# Patient Record
Sex: MALE | Race: WHITE | Employment: FULL TIME | URBAN - METROPOLITAN AREA
[De-identification: names, ages, dates, MRNs, and addresses within clinical notes are randomized per-mention and may not be internally consistent; named-entity substitution may affect disease eponyms.]

---

## 2019-04-07 ENCOUNTER — APPOINTMENT (OUTPATIENT)
Dept: GENERAL RADIOLOGY | Facility: CLINIC | Age: 34
End: 2019-04-07
Attending: EMERGENCY MEDICINE
Payer: COMMERCIAL

## 2019-04-07 ENCOUNTER — HOSPITAL ENCOUNTER (EMERGENCY)
Facility: CLINIC | Age: 34
Discharge: HOME | End: 2019-04-07
Attending: EMERGENCY MEDICINE | Admitting: EMERGENCY MEDICINE
Payer: COMMERCIAL

## 2019-04-07 VITALS
TEMPERATURE: 98.3 F | OXYGEN SATURATION: 99 % | HEART RATE: 71 BPM | WEIGHT: 165 LBS | DIASTOLIC BLOOD PRESSURE: 57 MMHG | BODY MASS INDEX: 23.62 KG/M2 | HEIGHT: 70 IN | RESPIRATION RATE: 16 BRPM | SYSTOLIC BLOOD PRESSURE: 152 MMHG

## 2019-04-07 DIAGNOSIS — S90.31XA CONTUSION OF RIGHT FOOT, INITIAL ENCOUNTER: ICD-10-CM

## 2019-04-07 PROCEDURE — 25000132 ZZH RX MED GY IP 250 OP 250 PS 637: Performed by: EMERGENCY MEDICINE

## 2019-04-07 PROCEDURE — 73630 X-RAY EXAM OF FOOT: CPT | Mod: RT

## 2019-04-07 PROCEDURE — 99283 EMERGENCY DEPT VISIT LOW MDM: CPT

## 2019-04-07 RX ORDER — IBUPROFEN 400 MG/1
800 TABLET, FILM COATED ORAL ONCE
Status: COMPLETED | OUTPATIENT
Start: 2019-04-07 | End: 2019-04-07

## 2019-04-07 RX ADMIN — IBUPROFEN 800 MG: 400 TABLET ORAL at 20:35

## 2019-04-07 ASSESSMENT — ENCOUNTER SYMPTOMS: ARTHRALGIAS: 1

## 2019-04-07 ASSESSMENT — MIFFLIN-ST. JEOR: SCORE: 1699.69

## 2019-04-07 NOTE — LETTER
April 7, 2019      To Whom It May Concern:      Aren Collier was seen in our Emergency Department today, 04/07/19. He has an injury that may require him to not bear weight on his right foot. Please allow him leniency at work for the next three days as he may not be able to do many work duties.    Sincerely,        Sarahi Burroughs MD

## 2019-04-07 NOTE — ED AVS SNAPSHOT
Emergency Department  [address] [place] [id number]  Phone:  [phone]  Fax:  [phone]                                    Aren Collier   MRN: [id number]    Department:   Emergency Department   Date of Visit:  4/7/2019           After Visit Summary Signature Page    I have received my discharge instructions, and my questions have been answered. I have discussed any challenges I see with this plan with the nurse or doctor.    ..........................................................................................................................................  Patient/Patient Representative Signature      ..........................................................................................................................................  Patient Representative Print Name and Relationship to Patient    ..................................................               ................................................  Date                                   Time    ..........................................................................................................................................  Reviewed by Signature/Title    ...................................................              ..............................................  Date                                               Time          22EPIC Rev 08/18

## 2019-04-08 NOTE — ED PROVIDER NOTES
"  History     Chief Complaint:  Foot Pain      HPI   Aren Collier is a 33 year old male who presents to the emergency department today for evaluation of foot pain. The patient reports today while opening up his car door hit the top of his R foot on the corner of the door. He developed short lasting pain on the dorsum of his right foot. He was able to ambulate and bear weight after this though continued to have mild pain. Then at 1600 today, he was stepping out of his car, and suddenly wasn't able to bear weight due to the pain. He tried icing his foot and soaking in epsom salts without improvement of his symptoms. Due to the increasing pain the patient presented to the emergency department today. The patient has no other concerns at this time.      Allergies:  No Known Drug Allergies        Medications:    The patient is currently on no regular medications.        Past Medical History:    History reviewed. No pertinent past medical history.       Past Surgical History:    Jaw surgery      Family History:    History reviewed. No pertinent family history.        Social History:  The patient was accompanied to the ED by himself.  Smoking Status: Never Smoker  Smokeless Tobacco: Never Used  Alcohol Use: No   Marital Status:  Single [1]       Review of Systems   Musculoskeletal: Positive for arthralgias (right dorsum foot).   All other systems reviewed and are negative.        Physical Exam   First Vitals:  BP: 152/57  Pulse: 71  Temp: 98.3  F (36.8  C)  Resp: 16  Height: 177.8 cm (5' 10\")  Weight: 74.8 kg (165 lb)  SpO2: 99 %      Physical Exam  General/Appearance: appears stated age, well-groomed, appears comfortable  Eyes: EOMI, no scleral injection, no icterus  ENT: MMM  Neck: supple, nl ROM, no stiffness  Cardiovascular: RRR, nl S1S2, no m/r/g, 2+ pulses in all 4 extremities, cap refill <2sec  Respiratory: CTAB, good air movement throughout, no wheezes/rhonchi/rales, no increased WOB, no retractions  MSK: ABBIE, " good tone, no bony abnormality, able to move toes without pain/difficulty, no ttp to base of 5th metatarsal/ankle, mild edema and ttp to mid-R dorsal foot  Skin: warm and well-perfused, no rash, no edema, no ecchymosis, nl turgor  Neuro: nl sensation to R foot    Emergency Department Course     Imaging:  Radiology findings were communicated with the patient who voiced understanding of the findings.    Foot XR, G/E 3 views, Right:  FINDINGS: No evidence for fracture. No malalignment. No soft tissue  swelling. No evidence for ankle joint effusion  reading per radiology.      Interventions:  Ibuprofen 800mg PO    Emergency Department Course:  Nursing notes and vitals reviewed.  1940: I performed an exam of the patient as documented above.  The patient was sent for a Foot XR while in the emergency department, results above.     2007 Patient rechecked and updated.    Findings and plan explained to the Patient. Patient discharged home with instructions regarding supportive care, medications, and reasons to return. The importance of close follow-up was reviewed.   I personally reviewed the imaging results with the Patient and answered all related questions prior to discharge.   Impression & Plan      Medical Decision Making:  This patient is a healthy 33-year-old male who presents with right mid foot pain after mild trauma.  There was no crush injury, twisting mechanism.  He hit the top of his foot on a car door.  He had increasing pain and difficulty bearing weight since.  X-ray was obtained which was negative.  He is got normal neurovascular status.  This all is associated with mild trauma and no evidence of joint involvement, such as gout.  There is no evidence of erythema/warmth to suggest infection.  I suspect ultimately this is with tissue/bony contusion.  We offered crutches but he wishes to forego this at this time.  He will continue to use ice and ibuprofen as needed.      Diagnosis:    ICD-10-CM    1. Contusion  of right foot, initial encounter S90.31XA        Disposition:  Discharged to home.     Scribe Disclosure:  I, Yanet Yamile, am serving as a scribe at 7:41 PM on 4/7/2019 to document services personally performed by Sarahi Burroughs* based on my observations and the provider's statements to me.    Yanet Ovalle  4/7/2019    EMERGENCY DEPARTMENT       Sarahi Burroughs MD  04/07/19 2032

## 2023-10-18 NOTE — ED TRIAGE NOTES
Pt said he bumped his right foot on the car door earlier today.  He is unable to put his full weight on the foot.     
0 (no pain/absence of nonverbal indicators of pain)